# Patient Record
Sex: FEMALE | Race: OTHER | Employment: OTHER | URBAN - METROPOLITAN AREA
[De-identification: names, ages, dates, MRNs, and addresses within clinical notes are randomized per-mention and may not be internally consistent; named-entity substitution may affect disease eponyms.]

---

## 2017-10-02 ENCOUNTER — HOSPITAL ENCOUNTER (OUTPATIENT)
Dept: MRI IMAGING | Age: 67
Discharge: HOME | End: 2017-10-02
Payer: MEDICARE

## 2017-10-02 DIAGNOSIS — M19.011 LOCALIZED OSTEOARTHROSIS, SHOULDER REGION, RIGHT: ICD-10-CM

## 2017-10-02 DIAGNOSIS — S46.911A RIGHT SHOULDER STRAIN, INITIAL ENCOUNTER: ICD-10-CM

## 2017-10-02 PROCEDURE — 73221 MRI JOINT UPR EXTREM W/O DYE: CPT

## 2017-11-08 ENCOUNTER — HOSPITAL ENCOUNTER (OUTPATIENT)
Dept: ORTHOPEDIC SURGERY | Age: 67
Discharge: HOME | End: 2017-11-08
Payer: MEDICARE

## 2017-11-08 DIAGNOSIS — M17.12 PRIMARY LOCALIZED OSTEOARTHROSIS OF LEFT LOWER LEG: ICD-10-CM

## 2017-11-08 DIAGNOSIS — M17.11 PRIMARY LOCALIZED OSTEOARTHROSIS OF RIGHT LOWER LEG: ICD-10-CM

## 2017-11-08 PROCEDURE — 73560 X-RAY EXAM OF KNEE 1 OR 2: CPT

## 2017-11-16 ENCOUNTER — HOSPITAL ENCOUNTER (OUTPATIENT)
Dept: MRI IMAGING | Age: 67
Discharge: HOME | End: 2017-11-16
Payer: MEDICARE

## 2017-11-16 DIAGNOSIS — M17.12 PRIMARY LOCALIZED OSTEOARTHROSIS OF LEFT LOWER LEG: ICD-10-CM

## 2017-11-16 DIAGNOSIS — M71.22 SYNOVIAL CYST OF LEFT POPLITEAL SPACE: ICD-10-CM

## 2017-11-16 PROCEDURE — 73721 MRI JNT OF LWR EXTRE W/O DYE: CPT

## 2019-02-27 ENCOUNTER — HOSPITAL ENCOUNTER (OUTPATIENT)
Dept: ORTHOPEDIC SURGERY | Age: 69
Discharge: HOME | End: 2019-03-01
Payer: MEDICARE

## 2019-02-27 DIAGNOSIS — M17.9 OSTEOARTHRITIS OF KNEE, UNSPECIFIED (CODE): ICD-10-CM

## 2019-02-27 PROCEDURE — 73560 X-RAY EXAM OF KNEE 1 OR 2: CPT

## 2024-01-20 ENCOUNTER — HOSPITAL ENCOUNTER (EMERGENCY)
Age: 74
Discharge: HOME | End: 2024-01-20
Attending: EMERGENCY MEDICINE
Payer: MEDICARE

## 2024-01-20 VITALS
HEIGHT: 62 IN | DIASTOLIC BLOOD PRESSURE: 100 MMHG | TEMPERATURE: 97.8 F | WEIGHT: 136 LBS | SYSTOLIC BLOOD PRESSURE: 168 MMHG | HEART RATE: 68 BPM | BODY MASS INDEX: 25.03 KG/M2 | RESPIRATION RATE: 16 BRPM | OXYGEN SATURATION: 99 %

## 2024-01-20 DIAGNOSIS — S61.215A LACERATION OF LEFT RING FINGER WITHOUT FOREIGN BODY WITHOUT DAMAGE TO NAIL, INITIAL ENCOUNTER: Primary | ICD-10-CM

## 2024-01-20 PROCEDURE — 6370000000 HC RX 637 (ALT 250 FOR IP): Performed by: EMERGENCY MEDICINE

## 2024-01-20 PROCEDURE — 99282 EMERGENCY DEPT VISIT SF MDM: CPT

## 2024-01-20 RX ADMIN — Medication 1 EACH: at 20:50

## 2024-01-20 ASSESSMENT — ENCOUNTER SYMPTOMS
NAUSEA: 0
EYE DISCHARGE: 0
EYE REDNESS: 0
VOMITING: 0
COUGH: 0
ABDOMINAL PAIN: 0
SORE THROAT: 0
SHORTNESS OF BREATH: 0
BACK PAIN: 0

## 2024-01-20 ASSESSMENT — PAIN - FUNCTIONAL ASSESSMENT: PAIN_FUNCTIONAL_ASSESSMENT: NONE - DENIES PAIN

## 2024-01-20 ASSESSMENT — LIFESTYLE VARIABLES
HOW MANY STANDARD DRINKS CONTAINING ALCOHOL DO YOU HAVE ON A TYPICAL DAY: PATIENT DOES NOT DRINK
HOW OFTEN DO YOU HAVE A DRINK CONTAINING ALCOHOL: NEVER

## 2024-01-21 NOTE — ED PROVIDER NOTES
[facility] ED  EMERGENCY DEPARTMENT ENCOUNTER      Pt Name: Miller Sales  MRN: [id number]  Birthdate 1950  Date of evaluation: 1/20/2024  Provider: ARNULFO PERALTA DO    CHIEF COMPLAINT       Chief Complaint   Patient presents with    Laceration     Cut left ring finger with a knife when opening a pack of cheese         HISTORY OF PRESENT ILLNESS   (Location/Symptom, Timing/Onset, Context/Setting, Quality, Duration, Modifying Factors, Severity)  Note limiting factors.   Miller Sales is a 73 y.o. female who presents to the emergency department with a finger laceration after cutting cheese tonight.     The history is provided by the patient.   Laceration  Location:  Finger  Finger laceration location:  L ring finger  Depth:  Cutaneous  Quality: avulsion    Bleeding: controlled    Laceration mechanism:  Knife  Pain details:     Quality:  Aching    Severity:  Mild    Timing:  Constant    Progression:  Unchanged  Relieved by:  Nothing  Worsened by:  Nothing  Ineffective treatments:  Pressure  Associated symptoms: swelling    Associated symptoms: no fever and no rash        Nursing Notes were reviewed.    REVIEW OF SYSTEMS    (2-9 systems for level 4, 10 or more for level 5)     Review of Systems   Constitutional:  Negative for chills and fever.   HENT:  Negative for ear pain and sore throat.    Eyes:  Negative for discharge and redness.   Respiratory:  Negative for cough and shortness of breath.    Cardiovascular:  Negative for chest pain and palpitations.   Gastrointestinal:  Negative for abdominal pain, nausea and vomiting.   Genitourinary:  Negative for difficulty urinating and dysuria.   Musculoskeletal:  Negative for back pain and neck pain.   Skin:  Positive for wound. Negative for rash.   Neurological:  Negative for dizziness and syncope.   Psychiatric/Behavioral:  Negative for confusion. The patient is not nervous/anxious.    All other systems reviewed and are negative.      Except as

## 2024-01-31 ENCOUNTER — OFFICE VISIT (OUTPATIENT)
Dept: ORTHOPEDIC SURGERY | Facility: CLINIC | Age: 74
End: 2024-01-31
Payer: MEDICARE

## 2024-01-31 DIAGNOSIS — M75.101 TEAR OF RIGHT ROTATOR CUFF, UNSPECIFIED TEAR EXTENT, UNSPECIFIED WHETHER TRAUMATIC: ICD-10-CM

## 2024-01-31 PROCEDURE — 20610 DRAIN/INJ JOINT/BURSA W/O US: CPT | Performed by: ORTHOPAEDIC SURGERY

## 2024-01-31 PROCEDURE — 99213 OFFICE O/P EST LOW 20 MIN: CPT | Performed by: ORTHOPAEDIC SURGERY

## 2024-01-31 RX ORDER — LIDOCAINE HYDROCHLORIDE 10 MG/ML
5 INJECTION INFILTRATION; PERINEURAL
Status: COMPLETED | OUTPATIENT
Start: 2024-01-31 | End: 2024-01-31

## 2024-01-31 RX ORDER — BETAMETHASONE SODIUM PHOSPHATE AND BETAMETHASONE ACETATE 3; 3 MG/ML; MG/ML
2 INJECTION, SUSPENSION INTRA-ARTICULAR; INTRALESIONAL; INTRAMUSCULAR; SOFT TISSUE
Status: COMPLETED | OUTPATIENT
Start: 2024-01-31 | End: 2024-01-31

## 2024-01-31 RX ADMIN — LIDOCAINE HYDROCHLORIDE 5 ML: 10 INJECTION INFILTRATION; PERINEURAL at 16:55

## 2024-01-31 RX ADMIN — BETAMETHASONE SODIUM PHOSPHATE AND BETAMETHASONE ACETATE 2 ML: 3; 3 INJECTION, SUSPENSION INTRA-ARTICULAR; INTRALESIONAL; INTRAMUSCULAR; SOFT TISSUE at 16:55

## 2024-01-31 NOTE — PROGRESS NOTES
History of present illness: Patient with a history of right shoulder arthritic pain she has done well with a series of fluoroscopy guided injections into the joint recently the last shot helped only for a couple months she is off to [place] next week and came in for treatment of her right shoulder if she is having more pain pain down the arm rating down the arm lifting pain night pain more signs of cuff degeneration or tearing in addition to the arthritis    Physical exam:    General: No acute distress or breathing difficulty or discomfort, pleasant and cooperative with the examination.    Extremities: The right shoulder was inspected and was found to have no obvious deformity.  There was tenderness to touch over the lateral edges of the shoulder over the rotator cuff insertion.  Active forward flexion 120 degrees, external rotation to 60 degrees, abduction to 50 degrees, and internal rotation to the level of L2.    At this time the shoulder is neurovascular tact and neurosensory intact.  Motor intact C5-T1.  There was no obvious neck pain or radiculopathy noted.  There was no gross instability or adhesive capsulitis symptoms.  There was no evidence of apprehension or apprehension suppression.    Strength was tested in 4 planes with weakness in the supraspinatus strength testing and external rotation position.  There was no strength deficit in internal rotation.  Impingement signs were positive both supine and standing for impingement test type I and II.  There was mild pain over the bicipital groove with a positive speeds sign    Before aspiration injection the benefits of a cortisone injection including infection, local skin irritation, skin atrophy, calcification, continued pain and discomfort, elevated blood sugar, burning, failure to relieve pain, possible late infection were discussed with the patient.    Postprocedure discomfort can be alleviated with additional medications, ice, elevation, rest over the first  24 hours as recommended.    Patient verbalized understanding and wanted to proceed with the planned procedure.    After informed consent was provided and allergies verified, the patient was positioned appropriately on thel bed.  The right shoulder to be aspirated and injected was prepped and draped in a sterile fashion.  The skin was anesthetized with ethyl chloride spray.  A joint aspiration was to be performed an 18-gauge needle was used otherwise a 22-gauge needle was used to inject the appropriate joint.    Joint injection was performed with a mixture of 5 cc 1% lidocaine plain and 2 cc Celestone Soluspan 6 mg per mL.  The needle was removed and the puncture site closed and sealed with a Band-Aid.  The patient tolerated the procedure well.    Diagnostic studies: No new x-rays    Impression: History of moderate arthritic change now with onset of cuff degeneration    Plan: Right shoulder injection ice elevate gentle range of motion program we been treating her conservatively for several years now for MRI for surgical planning to evaluate cuff integrity we will see her back after MRI imaging study    If the bursal injection today in the office does not provide relief we can still [place] back and do a fluoro-guided injection anytime in February  L Inj/Asp: R subacromial bursa on 1/31/2024 4:55 PM  Indications: pain  Details: 22 G needle, posterior approach  Medications: 2 mL betamethasone acet,sod phos 6 mg/mL; 5 mL lidocaine 10 mg/mL (1 %)  Outcome: tolerated well, no immediate complications  Procedure, treatment alternatives, risks and benefits explained, specific risks discussed. Consent was given by the patient. Immediately prior to procedure a time out was called to verify the correct patient, procedure, equipment, support staff and site/side marked as required.

## 2024-02-09 ENCOUNTER — TELEPHONE (OUTPATIENT)
Dept: ORTHOPEDIC SURGERY | Facility: CLINIC | Age: 74
End: 2024-02-09
Payer: MEDICARE

## 2024-02-09 NOTE — TELEPHONE ENCOUNTER
Patient LVM that the shoulder injection didn't help and wants to do the Fluoro injection at [facility]. Patient also needs a prednisone prescription because of the allergy to dye. She also said she has an MRI scheduled and needs prednisone for that as well.

## 2024-02-12 DIAGNOSIS — M75.101 TEAR OF RIGHT ROTATOR CUFF, UNSPECIFIED TEAR EXTENT, UNSPECIFIED WHETHER TRAUMATIC: ICD-10-CM

## 2024-02-12 RX ORDER — PREDNISONE 10 MG/1
30 TABLET ORAL SEE ADMIN INSTRUCTIONS
Qty: 6 TABLET | Refills: 0 | Status: SHIPPED | OUTPATIENT
Start: 2024-02-12 | End: 2024-02-22

## 2024-02-13 ENCOUNTER — TELEPHONE (OUTPATIENT)
Dept: ORTHOPEDIC SURGERY | Facility: CLINIC | Age: 74
End: 2024-02-13
Payer: MEDICARE

## 2024-02-13 RX ORDER — PREDNISONE 10 MG/1
30 TABLET ORAL SEE ADMIN INSTRUCTIONS
Qty: 6 TABLET | Refills: 0 | Status: SHIPPED | OUTPATIENT
Start: 2024-02-13 | End: 2024-02-23

## 2024-02-14 ENCOUNTER — TELEPHONE (OUTPATIENT)
Dept: ORTHOPEDIC SURGERY | Facility: CLINIC | Age: 74
End: 2024-02-14
Payer: MEDICARE

## 2024-02-14 DIAGNOSIS — M19.011 PRIMARY OSTEOARTHRITIS OF RIGHT SHOULDER: ICD-10-CM

## 2024-02-14 RX ORDER — PREDNISONE 50 MG/1
TABLET ORAL
Qty: 3 TABLET | Refills: 0 | Status: SHIPPED | OUTPATIENT
Start: 2024-02-14

## 2024-02-14 NOTE — TELEPHONE ENCOUNTER
Patient said there was no fluoro injection order in for her? Also she said that that she is supposed to get a larger of prednisone for the MRI/Fluoro, Please call her

## 2024-02-23 ENCOUNTER — APPOINTMENT (OUTPATIENT)
Dept: RADIOLOGY | Facility: CLINIC | Age: 74
End: 2024-02-23
Payer: MEDICARE

## 2024-03-06 ENCOUNTER — APPOINTMENT (OUTPATIENT)
Dept: RADIOLOGY | Facility: HOSPITAL | Age: 74
End: 2024-03-06
Payer: MEDICARE

## 2024-03-08 ENCOUNTER — HOSPITAL ENCOUNTER (OUTPATIENT)
Dept: RADIOLOGY | Facility: CLINIC | Age: 74
Discharge: HOME | End: 2024-03-08
Payer: MEDICARE

## 2024-03-08 DIAGNOSIS — M75.101 TEAR OF RIGHT ROTATOR CUFF, UNSPECIFIED TEAR EXTENT, UNSPECIFIED WHETHER TRAUMATIC: ICD-10-CM

## 2024-03-08 PROCEDURE — 73221 MRI JOINT UPR EXTREM W/O DYE: CPT | Mod: RIGHT SIDE | Performed by: RADIOLOGY

## 2024-03-08 PROCEDURE — 73221 MRI JOINT UPR EXTREM W/O DYE: CPT | Mod: RT

## 2024-03-13 ENCOUNTER — HOSPITAL ENCOUNTER (OUTPATIENT)
Dept: RADIOLOGY | Facility: HOSPITAL | Age: 74
Discharge: HOME | End: 2024-03-13
Payer: MEDICARE

## 2024-03-13 DIAGNOSIS — M19.011 PRIMARY OSTEOARTHRITIS OF RIGHT SHOULDER: ICD-10-CM

## 2024-03-13 PROCEDURE — 77002 NEEDLE LOCALIZATION BY XRAY: CPT | Mod: RT

## 2024-03-13 PROCEDURE — 2550000001 HC RX 255 CONTRASTS: Performed by: ORTHOPAEDIC SURGERY

## 2024-03-13 PROCEDURE — 2500000004 HC RX 250 GENERAL PHARMACY W/ HCPCS (ALT 636 FOR OP/ED): Performed by: ORTHOPAEDIC SURGERY

## 2024-03-13 PROCEDURE — 77002 NEEDLE LOCALIZATION BY XRAY: CPT | Mod: RIGHT SIDE | Performed by: RADIOLOGY

## 2024-03-13 PROCEDURE — 20610 DRAIN/INJ JOINT/BURSA W/O US: CPT | Mod: RIGHT SIDE | Performed by: RADIOLOGY

## 2024-03-13 PROCEDURE — 2500000005 HC RX 250 GENERAL PHARMACY W/O HCPCS: Performed by: ORTHOPAEDIC SURGERY

## 2024-03-13 RX ORDER — METHYLPREDNISOLONE ACETATE 40 MG/ML
INJECTION, SUSPENSION INTRA-ARTICULAR; INTRALESIONAL; INTRAMUSCULAR; SOFT TISSUE AS NEEDED
Status: COMPLETED | OUTPATIENT
Start: 2024-03-13 | End: 2024-03-13

## 2024-03-13 RX ORDER — BUPIVACAINE HYDROCHLORIDE 5 MG/ML
INJECTION, SOLUTION EPIDURAL; INTRACAUDAL AS NEEDED
Status: COMPLETED | OUTPATIENT
Start: 2024-03-13 | End: 2024-03-13

## 2024-03-13 RX ORDER — LIDOCAINE HYDROCHLORIDE 20 MG/ML
INJECTION, SOLUTION EPIDURAL; INFILTRATION; INTRACAUDAL; PERINEURAL AS NEEDED
Status: COMPLETED | OUTPATIENT
Start: 2024-03-13 | End: 2024-03-13

## 2024-03-13 RX ADMIN — METHYLPREDNISOLONE ACETATE 1 MG: 40 INJECTION, SUSPENSION INTRA-ARTICULAR; INTRALESIONAL; INTRAMUSCULAR; INTRASYNOVIAL; SOFT TISSUE at 14:28

## 2024-03-13 RX ADMIN — IOHEXOL 3 ML: 300 INJECTION, SOLUTION INTRAVENOUS at 14:30

## 2024-03-13 RX ADMIN — LIDOCAINE HYDROCHLORIDE 10 ML: 20 INJECTION, SOLUTION EPIDURAL; INFILTRATION; INTRACAUDAL; PERINEURAL at 14:25

## 2024-03-13 RX ADMIN — BUPIVACAINE HYDROCHLORIDE 3 ML: 5 INJECTION, SOLUTION EPIDURAL; INTRACAUDAL; PERINEURAL at 14:27

## 2024-03-21 ENCOUNTER — APPOINTMENT (OUTPATIENT)
Dept: ORTHOPEDIC SURGERY | Facility: CLINIC | Age: 74
End: 2024-03-21
Payer: MEDICARE

## 2024-04-01 ENCOUNTER — OFFICE VISIT (OUTPATIENT)
Dept: ORTHOPEDIC SURGERY | Facility: CLINIC | Age: 74
End: 2024-04-01
Payer: MEDICARE

## 2024-04-01 DIAGNOSIS — M75.101 TEAR OF RIGHT ROTATOR CUFF, UNSPECIFIED TEAR EXTENT, UNSPECIFIED WHETHER TRAUMATIC: Primary | ICD-10-CM

## 2024-04-01 PROCEDURE — 99213 OFFICE O/P EST LOW 20 MIN: CPT | Performed by: ORTHOPAEDIC SURGERY

## 2024-04-01 NOTE — PROGRESS NOTES
History of present illness: Right shoulder pain discomfort patient had excellent relief of the fluoro-guided injection 3/13/2024    Physical exam:    General: No acute distress or breathing difficulty or discomfort, pleasant and cooperative with the examination.    Extremities: The right shoulder was inspected and was found to have no obvious deformity.  There was tenderness to touch over the lateral edges of the shoulder over the rotator cuff insertion.  Active forward flexion 110 degrees, external rotation to 50 degrees, abduction to 45 degrees, and internal rotation to the level of L2.    At this time the shoulder is neurovascular tact and neurosensory intact.  Motor intact C5-T1.  There was no obvious neck pain or radiculopathy noted.  There was no gross instability or adhesive capsulitis symptoms.  There was no evidence of apprehension or apprehension suppression.    Strength was tested in 4 planes with weakness in the supraspinatus strength testing and external rotation position.  There was no strength deficit in internal rotation.  Impingement signs were positive both supine and standing for impingement test type I and II.  There was mild pain over the bicipital groove with a positive speeds sign    Diagnostic studies: MRI shows combination of some moderate arthritic change synovitis long head bicep chronic tear some mild muscle atrophy of the infraspinatus chronic in nature also pretty severe thinning of the anterior supraspinatus tendon there may be a small full-thickness tear component    Impression: Right shoulder combination of arthritis and cuff atrophy and thinning with a possible small full-thickness component    Plan: Fortunately she is doing quite well with her last fluoro-guided injection    Patient can flex well up over now 1 50-60 she does not have pain discomfort again she is not a good candidate for operative repair of the rotator cuff due to severe cuff atrophy thinning and underlying  secondary arthritis hopefully she can avoid surgery for years to come eventually she may just need shoulder arthroplasty especially due to the muscle atrophy and wasting in the infraspinatus and severe thinning of the remaining cuff structures

## 2024-04-17 ENCOUNTER — OFFICE VISIT (OUTPATIENT)
Dept: ORTHOPEDIC SURGERY | Facility: CLINIC | Age: 74
End: 2024-04-17
Payer: MEDICARE

## 2024-04-17 DIAGNOSIS — M17.0 PRIMARY OSTEOARTHRITIS OF BOTH KNEES: Primary | ICD-10-CM

## 2024-04-17 PROCEDURE — 20610 DRAIN/INJ JOINT/BURSA W/O US: CPT | Performed by: ORTHOPAEDIC SURGERY

## 2024-04-17 NOTE — PROGRESS NOTES
Before the right injection the benefits of a hyaluronic acid  injection including infection, local skin irritation, skin atrophy, calcification, continued pain and discomfort, elevated blood sugar, burning, failure to relieve pain, possible late infection were discussed with the patient.    Postprocedure discomfort can be alleviated with additional medications, ice, elevation, rest over the first 24 hours as recommended.    Patient verbalized understanding and wanted to proceed with the planned procedure.    After informed consent was provided and allergies verified, the patient was positioned appropriately on the bed.  The RIGHT KNEE to be injected was prepped and draped in a sterile fashion.  The skin was anesthetized with ethyl chloride spray.   A  22-gauge needle was used to inject the appropriate joint.    Joint injection was performed with euflexxa contains 20 mg of hyaluronic acid per 2 mL was performed.  The needle was removed and the puncture site closed and sealed with a Band-Aid.  The patient tolerated the procedure well.      Before the left injection the benefits of a hyaluronic acid  injection including infection, local skin irritation, skin atrophy, calcification, continued pain and discomfort, elevated blood sugar, burning, failure to relieve pain, possible late infection were discussed with the patient.    Postprocedure discomfort can be alleviated with additional medications, ice, elevation, rest over the first 24 hours as recommended.    Patient verbalized understanding and wanted to proceed with the planned procedure.    After informed consent was provided and allergies verified, the patient was positioned appropriately on the bed.  The LEFT KNEE to be injected was prepped and draped in a sterile fashion.  The skin was anesthetized with ethyl chloride spray.   A  22-gauge needle was used to inject the appropriate joint.    Joint injection was performed with euflexxa contains 20 mg of hyaluronic  acid per 2 mL was performed.  The needle was removed and the puncture site closed and sealed with a Band-Aid.  The patient tolerated the procedure well.  L Inj/Asp: bilateral knee on 4/17/2024 11:43 AM  Indications: pain  Details: 22 G needle, medial approach  Medications (Right): 2 mL sodium hyaluronate 10 mg/mL(mw 2.4 -3.6 million)  Medications (Left): 2 mL sodium hyaluronate 10 mg/mL(mw 2.4 -3.6 million)  Outcome: tolerated well, no immediate complications  Procedure, treatment alternatives, risks and benefits explained, specific risks discussed. Consent was given by the patient. Immediately prior to procedure a time out was called to verify the correct patient, procedure, equipment, support staff and site/side marked as required. Patient was prepped and draped in the usual sterile fashion.

## 2024-04-24 ENCOUNTER — OFFICE VISIT (OUTPATIENT)
Dept: ORTHOPEDIC SURGERY | Facility: CLINIC | Age: 74
End: 2024-04-24
Payer: MEDICARE

## 2024-04-24 DIAGNOSIS — M17.0 PRIMARY OSTEOARTHRITIS OF BOTH KNEES: Primary | ICD-10-CM

## 2024-04-24 PROCEDURE — 20610 DRAIN/INJ JOINT/BURSA W/O US: CPT | Performed by: ORTHOPAEDIC SURGERY

## 2024-04-24 NOTE — PROGRESS NOTES
Before the left injection the benefits of a hyaluronic acid  injection including infection, local skin irritation, skin atrophy, calcification, continued pain and discomfort, elevated blood sugar, burning, failure to relieve pain, possible late infection were discussed with the patient.    Postprocedure discomfort can be alleviated with additional medications, ice, elevation, rest over the first 24 hours as recommended.    Patient verbalized understanding and wanted to proceed with the planned procedure.    After informed consent was provided and allergies verified, the patient was positioned appropriately on the bed.  The LEFT KNEE to be injected was prepped and draped in a sterile fashion.  The skin was anesthetized with ethyl chloride spray.   A  22-gauge needle was used to inject the appropriate joint.    Joint injection was performed with euflexxa contains 20 mg of hyaluronic acid per 2 mL was performed.  The needle was removed and the puncture site closed and sealed with a Band-Aid.  The patient tolerated the procedure well.    Before the right injection the benefits of a hyaluronic acid  injection including infection, local skin irritation, skin atrophy, calcification, continued pain and discomfort, elevated blood sugar, burning, failure to relieve pain, possible late infection were discussed with the patient.    Postprocedure discomfort can be alleviated with additional medications, ice, elevation, rest over the first 24 hours as recommended.    Patient verbalized understanding and wanted to proceed with the planned procedure.    After informed consent was provided and allergies verified, the patient was positioned appropriately on the bed.  The RIGHT KNEE to be injected was prepped and draped in a sterile fashion.  The skin was anesthetized with ethyl chloride spray.   A  22-gauge needle was used to inject the appropriate joint.    Joint injection was performed with euflexxa contains 20 mg of hyaluronic  acid per 2 mL was performed.  The needle was removed and the puncture site closed and sealed with a Band-Aid.  The patient tolerated the procedure well.          L Inj/Asp: bilateral knee on 4/24/2024 11:01 AM  Indications: pain  Details: 22 G needle, medial approach  Medications (Right): 2 mL sodium hyaluronate 10 mg/mL(mw 2.4 -3.6 million)  Medications (Left): 2 mL sodium hyaluronate 10 mg/mL(mw 2.4 -3.6 million)  Outcome: tolerated well, no immediate complications  Procedure, treatment alternatives, risks and benefits explained, specific risks discussed. Consent was given by the patient. Immediately prior to procedure a time out was called to verify the correct patient, procedure, equipment, support staff and site/side marked as required. Patient was prepped and draped in the usual sterile fashion.

## 2024-05-01 ENCOUNTER — OFFICE VISIT (OUTPATIENT)
Dept: ORTHOPEDIC SURGERY | Facility: CLINIC | Age: 74
End: 2024-05-01
Payer: MEDICARE

## 2024-05-01 DIAGNOSIS — M17.0 PRIMARY OSTEOARTHRITIS OF BOTH KNEES: Primary | ICD-10-CM

## 2024-05-01 PROCEDURE — 20610 DRAIN/INJ JOINT/BURSA W/O US: CPT | Performed by: ORTHOPAEDIC SURGERY

## 2024-05-01 PROCEDURE — 1159F MED LIST DOCD IN RCRD: CPT | Performed by: ORTHOPAEDIC SURGERY

## 2024-05-01 NOTE — PROGRESS NOTES
Before the right injection the benefits of a hyaluronic acid  injection including infection, local skin irritation, skin atrophy, calcification, continued pain and discomfort, elevated blood sugar, burning, failure to relieve pain, possible late infection were discussed with the patient.    Postprocedure discomfort can be alleviated with additional medications, ice, elevation, rest over the first 24 hours as recommended.    Patient verbalized understanding and wanted to proceed with the planned procedure.    After informed consent was provided and allergies verified, the patient was positioned appropriately on the bed.  The RIGHT KNEE to be injected was prepped and draped in a sterile fashion.  The skin was anesthetized with ethyl chloride spray.   A  22-gauge needle was used to inject the appropriate joint.    Joint injection was performed with euflexxa contains 20 mg of hyaluronic acid per 2 mL was performed.  The needle was removed and the puncture site closed and sealed with a Band-Aid.  The patient tolerated the procedure well.      Before the left injection the benefits of a hyaluronic acid  injection including infection, local skin irritation, skin atrophy, calcification, continued pain and discomfort, elevated blood sugar, burning, failure to relieve pain, possible late infection were discussed with the patient.    Postprocedure discomfort can be alleviated with additional medications, ice, elevation, rest over the first 24 hours as recommended.    Patient verbalized understanding and wanted to proceed with the planned procedure.    After informed consent was provided and allergies verified, the patient was positioned appropriately on the bed.  The LEFT KNEE to be injected was prepped and draped in a sterile fashion.  The skin was anesthetized with ethyl chloride spray.   A  22-gauge needle was used to inject the appropriate joint.    Joint injection was performed with euflexxa contains 20 mg of hyaluronic  acid per 2 mL was performed.  The needle was removed and the puncture site closed and sealed with a Band-Aid.  The patient tolerated the procedure well.      L Inj/Asp: bilateral knee on 5/1/2024 11:24 AM  Indications: pain  Details: 22 G needle, medial approach  Medications (Right): 2 mL sodium hyaluronate 10 mg/mL(mw 2.4 -3.6 million)  Medications (Left): 2 mL sodium hyaluronate 10 mg/mL(mw 2.4 -3.6 million)  Outcome: tolerated well, no immediate complications  Procedure, treatment alternatives, risks and benefits explained, specific risks discussed. Consent was given by the patient. Immediately prior to procedure a time out was called to verify the correct patient, procedure, equipment, support staff and site/side marked as required. Patient was prepped and draped in the usual sterile fashion.

## 2024-10-07 ENCOUNTER — APPOINTMENT (OUTPATIENT)
Dept: ORTHOPEDIC SURGERY | Facility: CLINIC | Age: 74
End: 2024-10-07
Payer: MEDICARE

## 2024-10-07 ENCOUNTER — HOSPITAL ENCOUNTER (OUTPATIENT)
Dept: RADIOLOGY | Facility: HOSPITAL | Age: 74
Discharge: HOME | End: 2024-10-07
Payer: MEDICARE

## 2024-10-07 DIAGNOSIS — M25.561 PAIN IN BOTH KNEES, UNSPECIFIED CHRONICITY: ICD-10-CM

## 2024-10-07 DIAGNOSIS — M25.562 PAIN IN BOTH KNEES, UNSPECIFIED CHRONICITY: ICD-10-CM

## 2024-10-07 DIAGNOSIS — M75.101 TEAR OF RIGHT ROTATOR CUFF, UNSPECIFIED TEAR EXTENT, UNSPECIFIED WHETHER TRAUMATIC: ICD-10-CM

## 2024-10-07 DIAGNOSIS — M17.0 PRIMARY OSTEOARTHRITIS OF BOTH KNEES: Primary | ICD-10-CM

## 2024-10-07 PROCEDURE — 99213 OFFICE O/P EST LOW 20 MIN: CPT | Performed by: ORTHOPAEDIC SURGERY

## 2024-10-07 PROCEDURE — 73560 X-RAY EXAM OF KNEE 1 OR 2: CPT | Mod: 50

## 2024-10-07 PROCEDURE — 73560 X-RAY EXAM OF KNEE 1 OR 2: CPT | Mod: BILATERAL PROCEDURE | Performed by: RADIOLOGY

## 2024-10-07 NOTE — PROGRESS NOTES
History of present illness: History of bilateral knee osteoarthritis has done well with gel injections for years    She also has right shoulder osteoarthrosis and does well with fluoroscopy guided injections spread out over a 6-month interval    Physical exam:    General: No acute distress or breathing difficulty or discomfort, pleasant and cooperative with the examination.    Extremities: Right shoulder exam    The right shoulder was inspected and was found to have no obvious deformity.  There was tenderness to touch over the lateral edges of the shoulder over the rotator cuff insertion.  Active forward flexion 110 degrees, external rotation to 50 degrees, abduction to 45 degrees, and internal rotation to the level of L2.    At this time the shoulder is neurovascular tact and neurosensory intact.  Motor intact C5-T1.  There was no obvious neck pain or radiculopathy noted.  There was no gross instability or adhesive capsulitis symptoms.  There was no evidence of apprehension or apprehension suppression.    Strength was tested in 4 planes with weakness in the supraspinatus strength testing and external rotation position.  There was no strength deficit in internal rotation.  Impingement signs were positive both supine and standing for impingement test type I and II.  There was mild pain over the bicipital groove with a positive speeds sign    Bilateral knee exam    Left knee    The affected left knee was examined and inspected and was tender to touch along the medial and lateral aspect with catching, locking or mechanical symptoms.    The skin was intact without breakdown or open wound.  Old incisions of present were healed.    There was a mild Patti exam seen with some evidence of instability and weakness in the collateral ligaments with varus valgus stressing and laxity in the anterior or posterior planes.    There was a negative Lachman's test pivot shift test and posterior drawer sign with no foot drop,  numbness or tingling.    Sensation, reflexes and pulses in the foot and ankle are preserved.  There was an effusion.  Range of motion showed good straight leg raise with flexion to 150 degrees and extension to 0 degrees.  The patient had the ability to bear weight but with discomfort.  The patient's gait was antalgic secondary to discomfort    Right knee    The affected right knee was examined and inspected and was tender to touch along the medial and lateral aspect with catching, locking or mechanical symptoms.    The skin was intact without breakdown or open wound.  Old incisions of present were healed.    There was a mild Patti exam seen with some evidence of instability and weakness in the collateral ligaments with varus valgus stressing and laxity in the anterior or posterior planes.    There was a negative Lachman's test pivot shift test and posterior drawer sign with no foot drop, numbness or tingling.    Sensation, reflexes and pulses in the foot and ankle are preserved.  There was an effusion.  Range of motion showed good straight leg raise with flexion to 150 degrees and extension to 0 degrees.  The patient had the ability to bear weight but with discomfort.  The patient's gait was antalgic secondary to discomfort    Diagnostic studies: X-rays bilateral knees show advanced end-stage osteoarthrosis left knee worse than the right knee        Impression: Known bilateral knee osteoarthritis    Known right shoulder osteoarthritis    Plan: Injection bilateral knee gel shots she must get Euflexxa as she reacted to all other gel injections to contain any byproducts impurities or poultry    Now for fluoroscopy guided right shoulder injection    Will see her back for her injection gel series in the office

## 2024-10-09 ENCOUNTER — TELEPHONE (OUTPATIENT)
Dept: ORTHOPEDIC SURGERY | Facility: CLINIC | Age: 74
End: 2024-10-09
Payer: MEDICARE

## 2024-10-09 DIAGNOSIS — M17.0 PRIMARY OSTEOARTHRITIS OF BOTH KNEES: ICD-10-CM

## 2024-10-09 RX ORDER — PREDNISONE 10 MG/1
TABLET ORAL
Qty: 30 TABLET | Refills: 0 | Status: SHIPPED | OUTPATIENT
Start: 2024-10-09

## 2024-10-21 ENCOUNTER — APPOINTMENT (OUTPATIENT)
Dept: ORTHOPEDIC SURGERY | Facility: CLINIC | Age: 74
End: 2024-10-21
Payer: MEDICARE

## 2024-10-21 ENCOUNTER — APPOINTMENT (OUTPATIENT)
Dept: RADIOLOGY | Facility: HOSPITAL | Age: 74
End: 2024-10-21
Payer: MEDICARE

## 2024-10-21 DIAGNOSIS — M17.0 PRIMARY OSTEOARTHRITIS OF BOTH KNEES: Primary | ICD-10-CM

## 2024-10-21 PROCEDURE — 20610 DRAIN/INJ JOINT/BURSA W/O US: CPT | Performed by: ORTHOPAEDIC SURGERY

## 2024-10-21 NOTE — PROGRESS NOTES
History of Present Illness  Chief Complaint   Patient presents with    Left Knee - Follow-up     Eufflexxa #1    Right Knee - Follow-up     Eufflexxa #1         Patient returns today for side: bilateral knee viscosupplementation injections.     Review of Systems   GENERAL: Negative for malaise, significant weight loss, fever  MUSCULOSKELETAL: see HPI  NEURO:  Negative    side: bilateral Knee Exam  Skin is intact without any evidence of erythema or ecchymosis   mild effusion  There is not tenderness over medial and lateral joint line    Assessment:  Patient with known osteoarthritis of the side: bilateral knee    Plan:  Bilateral Euflexxa injections administered under sterile conditions and tolerated well by patient.  Euflexxa is 10 mg/mL.  Patient was advised elevate extremity apply assumedly take anti-inflammatories if tolerated.  She will follow-up in 1 week for bilateral injections #2.      L Inj/Asp: bilateral knee on 10/21/2024 4:27 PM  Indications: pain  Details: 22 G needle, medial approach  Medications (Right): 2 mL sodium hyaluronate 10 mg/mL(mw 2.4 -3.6 million)  Medications (Left): 2 mL sodium hyaluronate 10 mg/mL(mw 2.4 -3.6 million)  Outcome: tolerated well, no immediate complications  Procedure, treatment alternatives, risks and benefits explained, specific risks discussed. Consent was given by the patient. Immediately prior to procedure a time out was called to verify the correct patient, procedure, equipment, support staff and site/side marked as required. Patient was prepped and draped in the usual sterile fashion.

## 2024-10-25 ENCOUNTER — HOSPITAL ENCOUNTER (OUTPATIENT)
Dept: RADIOLOGY | Facility: HOSPITAL | Age: 74
Discharge: HOME | End: 2024-10-25
Payer: MEDICARE

## 2024-10-25 DIAGNOSIS — M75.101 TEAR OF RIGHT ROTATOR CUFF, UNSPECIFIED TEAR EXTENT, UNSPECIFIED WHETHER TRAUMATIC: ICD-10-CM

## 2024-10-25 PROCEDURE — 2500000004 HC RX 250 GENERAL PHARMACY W/ HCPCS (ALT 636 FOR OP/ED): Performed by: RADIOLOGY

## 2024-10-25 PROCEDURE — 77002 NEEDLE LOCALIZATION BY XRAY: CPT | Mod: RT

## 2024-10-25 PROCEDURE — 96372 THER/PROPH/DIAG INJ SC/IM: CPT | Performed by: RADIOLOGY

## 2024-10-25 RX ORDER — METHYLPREDNISOLONE ACETATE 40 MG/ML
40 INJECTION, SUSPENSION INTRA-ARTICULAR; INTRALESIONAL; INTRAMUSCULAR; SOFT TISSUE ONCE
Status: COMPLETED | OUTPATIENT
Start: 2024-10-25 | End: 2024-10-25

## 2024-10-25 RX ORDER — LIDOCAINE HYDROCHLORIDE 20 MG/ML
20 INJECTION, SOLUTION INFILTRATION; PERINEURAL ONCE
Status: COMPLETED | OUTPATIENT
Start: 2024-10-25 | End: 2024-10-25

## 2024-10-25 RX ORDER — BUPIVACAINE HYDROCHLORIDE 5 MG/ML
10 INJECTION, SOLUTION EPIDURAL; INTRACAUDAL ONCE
Status: COMPLETED | OUTPATIENT
Start: 2024-10-25 | End: 2024-10-25

## 2024-10-28 ENCOUNTER — APPOINTMENT (OUTPATIENT)
Dept: ORTHOPEDIC SURGERY | Facility: CLINIC | Age: 74
End: 2024-10-28
Payer: MEDICARE

## 2024-10-28 DIAGNOSIS — M17.0 PRIMARY OSTEOARTHRITIS OF BOTH KNEES: Primary | ICD-10-CM

## 2024-10-28 PROCEDURE — 20610 DRAIN/INJ JOINT/BURSA W/O US: CPT | Performed by: ORTHOPAEDIC SURGERY

## 2024-11-04 ENCOUNTER — APPOINTMENT (OUTPATIENT)
Dept: ORTHOPEDIC SURGERY | Facility: CLINIC | Age: 74
End: 2024-11-04
Payer: MEDICARE

## 2024-11-04 DIAGNOSIS — M17.0 PRIMARY OSTEOARTHRITIS OF BOTH KNEES: Primary | ICD-10-CM

## 2024-11-04 PROCEDURE — 20610 DRAIN/INJ JOINT/BURSA W/O US: CPT | Performed by: ORTHOPAEDIC SURGERY

## 2024-11-04 NOTE — PROGRESS NOTES
Before the right injection the benefits of a hyaluronic acid  injection including infection, local skin irritation, skin atrophy, calcification, continued pain and discomfort, elevated blood sugar, burning, failure to relieve pain, possible late infection were discussed with the patient.    Postprocedure discomfort can be alleviated with additional medications, ice, elevation, rest over the first 24 hours as recommended.    Patient verbalized understanding and wanted to proceed with the planned procedure.    After informed consent was provided and allergies verified, the patient was positioned appropriately on the bed.  The RIGHT KNEE to be injected was prepped and draped in a sterile fashion.  The skin was anesthetized with ethyl chloride spray.   A  22-gauge needle was used to inject the appropriate joint.    Joint injection was performed with euflexxa contains 20 mg of hyaluronic acid per 2 mL was performed.  The needle was removed and the puncture site closed and sealed with a Band-Aid.  The patient tolerated the procedure well.    Before the left injection the benefits of a hyaluronic acid  injection including infection, local skin irritation, skin atrophy, calcification, continued pain and discomfort, elevated blood sugar, burning, failure to relieve pain, possible late infection were discussed with the patient.    Postprocedure discomfort can be alleviated with additional medications, ice, elevation, rest over the first 24 hours as recommended.    Patient verbalized understanding and wanted to proceed with the planned procedure.    After informed consent was provided and allergies verified, the patient was positioned appropriately on the bed.  The LEFT KNEE to be injected was prepped and draped in a sterile fashion.  The skin was anesthetized with ethyl chloride spray.   A  22-gauge needle was used to inject the appropriate joint.    Joint injection was performed with euflexxa contains 20 mg of hyaluronic  acid per 2 mL was performed.  The needle was removed and the puncture site closed and sealed with a Band-Aid.  The patient tolerated the procedure well.    L Inj/Asp: bilateral knee on 11/4/2024 3:22 PM  Indications: pain  Details: 22 G needle, medial approach  Medications (Right): 2 mL sodium hyaluronate 10 mg/mL(mw 2.4 -3.6 million)  Medications (Left): 2 mL sodium hyaluronate 10 mg/mL(mw 2.4 -3.6 million)  Outcome: tolerated well, no immediate complications  Procedure, treatment alternatives, risks and benefits explained, specific risks discussed. Consent was given by the patient. Immediately prior to procedure a time out was called to verify the correct patient, procedure, equipment, support staff and site/side marked as required. Patient was prepped and draped in the usual sterile fashion.

## 2025-04-21 ENCOUNTER — APPOINTMENT (OUTPATIENT)
Dept: ORTHOPEDIC SURGERY | Facility: CLINIC | Age: 75
End: 2025-04-21
Payer: MEDICARE

## 2025-04-21 DIAGNOSIS — M75.101 TEAR OF RIGHT ROTATOR CUFF, UNSPECIFIED TEAR EXTENT, UNSPECIFIED WHETHER TRAUMATIC: Primary | ICD-10-CM

## 2025-04-21 DIAGNOSIS — M17.11 PRIMARY OSTEOARTHRITIS OF RIGHT KNEE: ICD-10-CM

## 2025-04-21 PROCEDURE — 20610 DRAIN/INJ JOINT/BURSA W/O US: CPT | Performed by: ORTHOPAEDIC SURGERY

## 2025-04-21 PROCEDURE — 99214 OFFICE O/P EST MOD 30 MIN: CPT | Performed by: ORTHOPAEDIC SURGERY

## 2025-04-21 RX ORDER — LIDOCAINE HYDROCHLORIDE 10 MG/ML
5 INJECTION, SOLUTION INFILTRATION; PERINEURAL
Status: COMPLETED | OUTPATIENT
Start: 2025-04-21 | End: 2025-04-21

## 2025-04-21 RX ORDER — BETAMETHASONE SODIUM PHOSPHATE AND BETAMETHASONE ACETATE 3; 3 MG/ML; MG/ML
2 INJECTION, SUSPENSION INTRA-ARTICULAR; INTRALESIONAL; INTRAMUSCULAR; SOFT TISSUE
Status: COMPLETED | OUTPATIENT
Start: 2025-04-21 | End: 2025-04-21

## 2025-04-21 RX ADMIN — BETAMETHASONE SODIUM PHOSPHATE AND BETAMETHASONE ACETATE 2 ML: 3; 3 INJECTION, SUSPENSION INTRA-ARTICULAR; INTRALESIONAL; INTRAMUSCULAR; SOFT TISSUE at 10:53

## 2025-04-21 RX ADMIN — LIDOCAINE HYDROCHLORIDE 5 ML: 10 INJECTION, SOLUTION INFILTRATION; PERINEURAL at 10:52

## 2025-04-21 RX ADMIN — BETAMETHASONE SODIUM PHOSPHATE AND BETAMETHASONE ACETATE 2 ML: 3; 3 INJECTION, SUSPENSION INTRA-ARTICULAR; INTRALESIONAL; INTRAMUSCULAR; SOFT TISSUE at 10:52

## 2025-04-21 RX ADMIN — LIDOCAINE HYDROCHLORIDE 5 ML: 10 INJECTION, SOLUTION INFILTRATION; PERINEURAL at 10:53

## 2025-04-21 NOTE — PROGRESS NOTES
History of present illness: History of right shoulder arthritis bilateral knee arthritis presently having more pain right shoulder left knee    Physical exam:    General: No acute distress or breathing difficulty or discomfort, pleasant and cooperative with the examination.    Extremities: Right shoulder is examined  The right shoulder was inspected and was found to have no obvious deformity.  There was tenderness to touch over the lateral edges of the shoulder over the rotator cuff insertion.  Active forward flexion 120 degrees, external rotation to 60 degrees, abduction to 50 degrees, and internal rotation to the level of L2.    At this time the shoulder is neurovascular tact and neurosensory intact.  Motor intact C5-T1.  There was no obvious neck pain or radiculopathy noted.  There was no gross instability or adhesive capsulitis symptoms.  There was no evidence of apprehension or apprehension suppression.    Strength was tested in 4 planes with weakness in the supraspinatus strength testing and external rotation position.  There was no strength deficit in internal rotation.  Impingement signs were positive both supine and standing for impingement test type I and II.  There was mild pain over the bicipital groove with a positive speeds sign    Before aspiration injection the risks of a cortisone injection including infection, local skin irritation, skin atrophy, calcification, continued pain and discomfort, elevated blood sugar, burning, failure to relieve pain, possible late infection were discussed with the patient.    Postprocedure discomfort can be alleviated with additional medications, ice, elevation, rest over the first 24 hours as recommended.    Patient verbalized understanding and wanted to proceed with the planned procedure.    After informed consent was provided and allergies verified, the patient was positioned appropriately on thel bed.  The right shoulder to be aspirated and injected was prepped and  draped in a sterile fashion.  The skin was anesthetized with ethyl chloride spray.  A joint aspiration was to be performed an 18-gauge needle was used otherwise a 22-gauge needle was used to inject the appropriate joint.    Joint injection was performed with a mixture of 5 cc 1% lidocaine plain and 2 cc Celestone Soluspan 6 mg per mL.  The needle was removed and the puncture site closed and sealed with a Band-Aid.  The patient tolerated the procedure well.    Bilateral knee examination    Right knee    The affected right knee was examined and inspected and was tender to touch along the medial and lateral aspect with catching, locking or mechanical symptoms.    The skin was intact without breakdown or open wound.  Old incisions of present were healed.    There was a mild Patti exam seen with some evidence of instability and weakness in the collateral ligaments with varus valgus stressing and laxity in the anterior or posterior planes.    There was a negative Lachman's test pivot shift test and posterior drawer sign with no foot drop, numbness or tingling.    Sensation, reflexes and pulses in the foot and ankle are preserved.  There was an effusion.  Range of motion showed good straight leg raise with flexion to 150 degrees and extension to 0 degrees.  The patient had the ability to bear weight but with discomfort.  The patient's gait was antalgic secondary to discomfort    Left knee exam    The affected left knee was examined and inspected and was tender to touch along the medial and lateral aspect with catching, locking or mechanical symptoms.    The skin was intact without breakdown or open wound.  Old incisions of present were healed.    There was a mild Patti exam seen with some evidence of instability and weakness in the collateral ligaments with varus valgus stressing and laxity in the anterior or posterior planes.    There was a negative Lachman's test pivot shift test and posterior drawer sign with no  foot drop, numbness or tingling.    Sensation, reflexes and pulses in the foot and ankle are preserved.  There was an effusion.  Range of motion showed good straight leg raise with flexion to 150 degrees and extension to 0 degrees.  The patient had the ability to bear weight but with discomfort.  The patient's gait was antalgic secondary to discomfort.    Before aspiration injection the risks of a cortisone injection including infection, local skin irritation, skin atrophy, calcification, continued pain and discomfort, elevated blood sugar, burning, failure to relieve pain, possible late infection were discussed with the patient.    Postprocedure discomfort can be alleviated with additional medications, ice, elevation, rest over the first 24 hours as recommended.    Patient verbalized understanding and wanted to proceed with the planned procedure.    After informed consent was provided and allergies verified, the patient was positioned appropriately on the bed.  The left knee to be aspirated and injected was prepped and draped in a sterile fashion.  The skin was anesthetized with ethyl chloride spray.  A joint aspiration was to be performed an 18-gauge needle was used otherwise a 22-gauge needle was used to inject the appropriate joint.    Joint injection was performed with a mixture of 5 cc 1% lidocaine plain and 2 cc Celestone Soluspan 6 mg per mL.  The needle was removed and the puncture site closed and sealed with a Band-Aid.  The patient tolerated the procedure well.          Diagnostic studies: No new studies    Impression: Patient with bilateral knee severe arthrosis    Bilateral shoulder arthritis    Patient having more symptoms right shoulder left knee    Plan: Injection right shoulder today    Injection left knee today    Will order gel shots for both knees    Will also order a fluoroscopy guided intra-articular injection for the right shoulder    Will see her back for her gel injections after she gets  back from [place]     L Inj/Asp: R subacromial bursa on 4/21/2025 10:52 AM  Indications: pain  Details: 22 G needle, medial approach  Medications: 2 mL betamethasone acet,sod phos 6 mg/mL; 5 mL lidocaine 10 mg/mL (1 %)  Outcome: tolerated well, no immediate complications  Procedure, treatment alternatives, risks and benefits explained, specific risks discussed. Consent was given by the patient. Immediately prior to procedure a time out was called to verify the correct patient, procedure, equipment, support staff and site/side marked as required.       L Inj/Asp: L knee on 4/21/2025 10:53 AM  Indications: pain and diagnostic evaluation  Details: 22 G needle, medial approach  Medications: 2 mL betamethasone acet,sod phos 6 mg/mL; 5 mL lidocaine 10 mg/mL (1 %)  Outcome: tolerated well, no immediate complications  Procedure, treatment alternatives, risks and benefits explained, specific risks discussed. Consent was given by the patient. Immediately prior to procedure a time out was called to verify the correct patient, procedure, equipment, support staff and site/side marked as required. Patient was prepped and draped in the usual sterile fashion.

## 2025-06-16 ENCOUNTER — APPOINTMENT (OUTPATIENT)
Dept: ORTHOPEDIC SURGERY | Facility: CLINIC | Age: 75
End: 2025-06-16
Payer: MEDICARE

## 2025-06-16 DIAGNOSIS — M17.0 PRIMARY OSTEOARTHRITIS OF BOTH KNEES: Primary | ICD-10-CM

## 2025-06-16 PROCEDURE — 20610 DRAIN/INJ JOINT/BURSA W/O US: CPT | Performed by: ORTHOPAEDIC SURGERY

## 2025-06-16 NOTE — PROGRESS NOTES
Before the right injection the benefits of a hyaluronic acid  injection including infection, local skin irritation, skin atrophy, calcification, continued pain and discomfort, elevated blood sugar, burning, failure to relieve pain, possible late infection were discussed with the patient.    Postprocedure discomfort can be alleviated with additional medications, ice, elevation, rest over the first 24 hours as recommended.    Patient verbalized understanding and wanted to proceed with the planned procedure.    After informed consent was provided and allergies verified, the patient was positioned appropriately on the bed.  The RIGHT KNEE to be injected was prepped and draped in a sterile fashion.  The skin was anesthetized with ethyl chloride spray.   A  22-gauge needle was used to inject the appropriate joint.    Joint injection was performed with euflexxa contains 20 mg of hyaluronic acid per 2 mL was performed.  The needle was removed and the puncture site closed and sealed with a Band-Aid.  The patient tolerated the procedure well.    Before the left injection the benefits of a hyaluronic acid  injection including infection, local skin irritation, skin atrophy, calcification, continued pain and discomfort, elevated blood sugar, burning, failure to relieve pain, possible late infection were discussed with the patient.    Postprocedure discomfort can be alleviated with additional medications, ice, elevation, rest over the first 24 hours as recommended.    Patient verbalized understanding and wanted to proceed with the planned procedure.    After informed consent was provided and allergies verified, the patient was positioned appropriately on the bed.  The LEFT KNEE to be injected was prepped and draped in a sterile fashion.  The skin was anesthetized with ethyl chloride spray.   A  22-gauge needle was used to inject the appropriate joint.    Joint injection was performed with euflexxa contains 20 mg of hyaluronic  acid per 2 mL was performed.  The needle was removed and the puncture site closed and sealed with a Band-Aid.  The patient tolerated the procedure well.    L Inj/Asp: bilateral knee on 6/16/2025 1:05 PM  Indications: pain  Details: 22 G needle, medial approach  Medications (Right): 2 mL sodium hyaluronate 10 mg/mL(mw 2.4 -3.6 million)  Medications (Left): 2 mL sodium hyaluronate 10 mg/mL(mw 2.4 -3.6 million)  Outcome: tolerated well, no immediate complications  Procedure, treatment alternatives, risks and benefits explained, specific risks discussed. Consent was given by the patient. Immediately prior to procedure a time out was called to verify the correct patient, procedure, equipment, support staff and site/side marked as required. Patient was prepped and draped in the usual sterile fashion.

## 2025-06-23 ENCOUNTER — APPOINTMENT (OUTPATIENT)
Dept: ORTHOPEDIC SURGERY | Facility: CLINIC | Age: 75
End: 2025-06-23
Payer: MEDICARE

## 2025-06-23 DIAGNOSIS — M17.0 PRIMARY OSTEOARTHRITIS OF BOTH KNEES: Primary | ICD-10-CM

## 2025-06-23 PROCEDURE — 20610 DRAIN/INJ JOINT/BURSA W/O US: CPT | Performed by: ORTHOPAEDIC SURGERY

## 2025-06-23 NOTE — PROGRESS NOTES
Before the right injection the benefits of a hyaluronic acid  injection including infection, local skin irritation, skin atrophy, calcification, continued pain and discomfort, elevated blood sugar, burning, failure to relieve pain, possible late infection were discussed with the patient.    Postprocedure discomfort can be alleviated with additional medications, ice, elevation, rest over the first 24 hours as recommended.    Patient verbalized understanding and wanted to proceed with the planned procedure.    After informed consent was provided and allergies verified, the patient was positioned appropriately on the bed.  The RIGHT KNEE to be injected was prepped and draped in a sterile fashion.  The skin was anesthetized with ethyl chloride spray.   A  22-gauge needle was used to inject the appropriate joint.    Joint injection was performed with euflexxa contains 20 mg of hyaluronic acid per 2 mL was performed.  The needle was removed and the puncture site closed and sealed with a Band-Aid.  The patient tolerated the procedure well.    Before the left injection the benefits of a hyaluronic acid  injection including infection, local skin irritation, skin atrophy, calcification, continued pain and discomfort, elevated blood sugar, burning, failure to relieve pain, possible late infection were discussed with the patient.    Postprocedure discomfort can be alleviated with additional medications, ice, elevation, rest over the first 24 hours as recommended.    Patient verbalized understanding and wanted to proceed with the planned procedure.    After informed consent was provided and allergies verified, the patient was positioned appropriately on the bed.  The LEFT KNEE to be injected was prepped and draped in a sterile fashion.  The skin was anesthetized with ethyl chloride spray.   A  22-gauge needle was used to inject the appropriate joint.    Joint injection was performed with euflexxa contains 20 mg of hyaluronic  acid per 2 mL was performed.  The needle was removed and the puncture site closed and sealed with a Band-Aid.  The patient tolerated the procedure well.    L Inj/Asp: bilateral knee on 6/23/2025 1:01 PM  Indications: pain  Details: 22 G needle, medial approach  Medications (Right): 2 mL sodium hyaluronate 10 mg/mL(mw 2.4 -3.6 million)  Medications (Left): 2 mL sodium hyaluronate 10 mg/mL(mw 2.4 -3.6 million)  Outcome: tolerated well, no immediate complications  Procedure, treatment alternatives, risks and benefits explained, specific risks discussed. Consent was given by the patient. Immediately prior to procedure a time out was called to verify the correct patient, procedure, equipment, support staff and site/side marked as required. Patient was prepped and draped in the usual sterile fashion.

## 2025-06-30 ENCOUNTER — APPOINTMENT (OUTPATIENT)
Dept: ORTHOPEDIC SURGERY | Facility: CLINIC | Age: 75
End: 2025-06-30
Payer: MEDICARE

## 2025-06-30 DIAGNOSIS — M17.0 PRIMARY OSTEOARTHRITIS OF BOTH KNEES: Primary | ICD-10-CM

## 2025-06-30 PROCEDURE — 99024 POSTOP FOLLOW-UP VISIT: CPT | Performed by: ORTHOPAEDIC SURGERY

## 2025-06-30 PROCEDURE — 20610 DRAIN/INJ JOINT/BURSA W/O US: CPT | Performed by: ORTHOPAEDIC SURGERY

## 2025-06-30 NOTE — PROGRESS NOTES
Before the right injection the benefits of a hyaluronic acid  injection including infection, local skin irritation, skin atrophy, calcification, continued pain and discomfort, elevated blood sugar, burning, failure to relieve pain, possible late infection were discussed with the patient.    Postprocedure discomfort can be alleviated with additional medications, ice, elevation, rest over the first 24 hours as recommended.    Patient verbalized understanding and wanted to proceed with the planned procedure.    After informed consent was provided and allergies verified, the patient was positioned appropriately on the bed.  The RIGHT KNEE to be injected was prepped and draped in a sterile fashion.  The skin was anesthetized with ethyl chloride spray.   A  22-gauge needle was used to inject the appropriate joint.    Joint injection was performed with euflexxa contains 20 mg of hyaluronic acid per 2 mL was performed.  The needle was removed and the puncture site closed and sealed with a Band-Aid.  The patient tolerated the procedure well.  Before the left injection the benefits of a hyaluronic acid  injection including infection, local skin irritation, skin atrophy, calcification, continued pain and discomfort, elevated blood sugar, burning, failure to relieve pain, possible late infection were discussed with the patient.    Postprocedure discomfort can be alleviated with additional medications, ice, elevation, rest over the first 24 hours as recommended.    Patient verbalized understanding and wanted to proceed with the planned procedure.    After informed consent was provided and allergies verified, the patient was positioned appropriately on the bed.  The LEFT KNEE to be injected was prepped and draped in a sterile fashion.  The skin was anesthetized with ethyl chloride spray.   A  22-gauge needle was used to inject the appropriate joint.    Joint injection was performed with euflexxa contains 20 mg of hyaluronic  acid per 2 mL was performed.  The needle was removed and the puncture site closed and sealed with a Band-Aid.  The patient tolerated the procedure well.      L Inj/Asp: bilateral knee on 6/30/2025 12:58 PM  Indications: pain  Details: 22 G needle, medial approach  Medications (Right): 2 mL sodium hyaluronate 10 mg/mL(mw 2.4 -3.6 million)  Medications (Left): 2 mL sodium hyaluronate 10 mg/mL(mw 2.4 -3.6 million)  Outcome: tolerated well, no immediate complications  Procedure, treatment alternatives, risks and benefits explained, specific risks discussed. Consent was given by the patient. Immediately prior to procedure a time out was called to verify the correct patient, procedure, equipment, support staff and site/side marked as required. Patient was prepped and draped in the usual sterile fashion.

## 2025-07-16 ENCOUNTER — TELEPHONE (OUTPATIENT)
Dept: ORTHOPEDIC SURGERY | Facility: CLINIC | Age: 75
End: 2025-07-16
Payer: MEDICARE

## 2025-07-16 DIAGNOSIS — M19.011 PRIMARY OSTEOARTHRITIS OF RIGHT SHOULDER: ICD-10-CM

## 2025-07-16 DIAGNOSIS — M75.101 TEAR OF RIGHT ROTATOR CUFF, UNSPECIFIED TEAR EXTENT, UNSPECIFIED WHETHER TRAUMATIC: ICD-10-CM

## 2025-07-16 RX ORDER — PREDNISONE 50 MG/1
TABLET ORAL
Qty: 3 TABLET | Refills: 0 | Status: SHIPPED | OUTPATIENT
Start: 2025-07-16

## 2025-07-16 NOTE — TELEPHONE ENCOUNTER
Patient LVM that she is supposed to get a fluoro injection and she has an allergy and that  was going to send in Prednisone?

## 2025-08-04 ENCOUNTER — HOSPITAL ENCOUNTER (OUTPATIENT)
Dept: RADIOLOGY | Facility: HOSPITAL | Age: 75
Discharge: HOME | End: 2025-08-04
Payer: MEDICARE

## 2025-08-04 ENCOUNTER — HOSPITAL ENCOUNTER (OUTPATIENT)
Dept: RADIOLOGY | Facility: HOSPITAL | Age: 75
End: 2025-08-04
Payer: MEDICARE

## 2025-08-04 DIAGNOSIS — M75.101 TEAR OF RIGHT ROTATOR CUFF, UNSPECIFIED TEAR EXTENT, UNSPECIFIED WHETHER TRAUMATIC: ICD-10-CM

## 2025-08-04 PROCEDURE — 2500000004 HC RX 250 GENERAL PHARMACY W/ HCPCS (ALT 636 FOR OP/ED): Performed by: ORTHOPAEDIC SURGERY

## 2025-08-04 PROCEDURE — 2550000001 HC RX 255 CONTRASTS: Performed by: ORTHOPAEDIC SURGERY

## 2025-08-04 PROCEDURE — 20610 DRAIN/INJ JOINT/BURSA W/O US: CPT | Mod: RT

## 2025-08-04 PROCEDURE — 96372 THER/PROPH/DIAG INJ SC/IM: CPT | Performed by: ORTHOPAEDIC SURGERY

## 2025-08-04 RX ORDER — METHYLPREDNISOLONE ACETATE 40 MG/ML
40 INJECTION, SUSPENSION INTRA-ARTICULAR; INTRALESIONAL; INTRAMUSCULAR; SOFT TISSUE ONCE
Status: COMPLETED | OUTPATIENT
Start: 2025-08-04 | End: 2025-08-04

## 2025-08-04 RX ORDER — LIDOCAINE HYDROCHLORIDE 20 MG/ML
20 INJECTION, SOLUTION INFILTRATION; PERINEURAL ONCE
Status: COMPLETED | OUTPATIENT
Start: 2025-08-04 | End: 2025-08-04

## 2025-08-04 RX ORDER — BUPIVACAINE HYDROCHLORIDE 5 MG/ML
10 INJECTION, SOLUTION EPIDURAL; INTRACAUDAL; PERINEURAL ONCE
Status: COMPLETED | OUTPATIENT
Start: 2025-08-04 | End: 2025-08-04

## 2025-08-04 RX ADMIN — BUPIVACAINE HYDROCHLORIDE 3 MG: 5 INJECTION, SOLUTION EPIDURAL; INTRACAUDAL; PERINEURAL at 14:27

## 2025-08-04 RX ADMIN — IOHEXOL 1 ML: 300 INJECTION, SOLUTION INTRAVENOUS at 14:26

## 2025-08-04 RX ADMIN — LIDOCAINE HYDROCHLORIDE 7 ML: 20 INJECTION, SOLUTION INFILTRATION; PERINEURAL at 14:27

## 2025-08-04 RX ADMIN — METHYLPREDNISOLONE ACETATE 40 MG: 40 INJECTION, SUSPENSION INTRA-ARTICULAR; INTRALESIONAL; INTRAMUSCULAR; INTRASYNOVIAL; SOFT TISSUE at 14:27
